# Patient Record
Sex: FEMALE | URBAN - METROPOLITAN AREA
[De-identification: names, ages, dates, MRNs, and addresses within clinical notes are randomized per-mention and may not be internally consistent; named-entity substitution may affect disease eponyms.]

---

## 2024-10-11 ENCOUNTER — HOSPITAL ENCOUNTER (OUTPATIENT)
Dept: TELEMEDICINE | Facility: HOSPITAL | Age: 59
Discharge: HOME | End: 2024-10-11

## 2024-10-11 NOTE — PLAN OF CARE
Attempted to call in to the referring facility to evaluate TeleStroke patient.   No answer.   [facility] informed.     Karoline Shore MD  Vascular Neurology

## 2024-10-11 NOTE — SUBJECTIVE & OBJECTIVE
HPI:  59 y.o. female w/ PMHx of Hypertension, GERD, Arthritis, Hypothyrodism,  DONELL untreated, Glucose intolerance, Morbid obesity who presents w/ reported LUE pain/numbness/weakness.        Images personally reviewed and interpreted:  Study: Head CT  Study Interpretation: No acute intracranial abnormalities, specifically no early signs of ischemia and no intracranial hemorrhage.         Assessment and plan:  NIHSS    Lytics recommendation: Thrombolytic therapy not recommended due to Mild Non-Disabling Symptoms    Thrombectomy recommendation: Awaiting CTA results from [facility] for determination   Placement recommendation: pending further studies  admit to inpatient